# Patient Record
Sex: MALE | Race: WHITE | NOT HISPANIC OR LATINO | Employment: FULL TIME | URBAN - METROPOLITAN AREA
[De-identification: names, ages, dates, MRNs, and addresses within clinical notes are randomized per-mention and may not be internally consistent; named-entity substitution may affect disease eponyms.]

---

## 2023-07-26 ENCOUNTER — OFFICE VISIT (OUTPATIENT)
Dept: DERMATOLOGY | Facility: CLINIC | Age: 28
End: 2023-07-26
Payer: COMMERCIAL

## 2023-07-26 VITALS — BODY MASS INDEX: 34.36 KG/M2 | WEIGHT: 240 LBS | HEIGHT: 70 IN

## 2023-07-26 DIAGNOSIS — L40.9 PSORIASIS: Primary | ICD-10-CM

## 2023-07-26 DIAGNOSIS — L05.91 PILONIDAL CYST: ICD-10-CM

## 2023-07-26 PROCEDURE — 99203 OFFICE O/P NEW LOW 30 MIN: CPT | Performed by: DERMATOLOGY

## 2023-07-26 RX ORDER — CLOBETASOL PROPIONATE 0.46 MG/ML
SOLUTION TOPICAL
COMMUNITY
Start: 2019-09-10

## 2023-07-26 RX ORDER — CLOBETASOL PROPIONATE 0.5 MG/G
CREAM TOPICAL
COMMUNITY
Start: 2021-02-10

## 2023-07-26 RX ORDER — CLOBETASOL PROPIONATE 0.05 MG/G
GEL TOPICAL
COMMUNITY
Start: 2019-09-10

## 2023-07-26 RX ORDER — LEVOTHYROXINE SODIUM 0.05 MG/1
50 TABLET ORAL DAILY
COMMUNITY
Start: 2023-05-02

## 2023-07-26 NOTE — PATIENT INSTRUCTIONS
1. PSORIASIS VERSUS SEBORRHEIC DERMATITIS   Assessment and Plan:  Based on a thorough discussion of this condition and the management approach to it (including a comprehensive discussion of the known risks, side effects and potential benefits of treatment), the patient (family) agrees to implement the following specific plan:  Continue clobetasol gel and clobetasol solution as needed      Psoriasis is a chronic inflammatory condition that causes the body to make new skin cells in days rather than weeks. As these cells pile up on the surface of the skin, you may see thick, scaly patches of thickened skin. Psoriasis affects 2-4% of males and females. It can start at any age including childhood, with peaks of onset at 15-25 years and 50-60 years. It tends to persist lifelong, fluctuating in extent and severity. It is particularly common in Caucasians but may affect people of any race. About one-third of patients with psoriasis have family members with psoriasis. Psoriasis is multifactorial. It is classified as an immune-mediated inflammatory disease (IMID). Genetic factors are important and influence the type of psoriasis and response to treatment. What are the signs and symptoms of psoriasis? There are many different types of psoriasis that each have present uniquely. The types of psoriasis include:    Plaque psoriasis: About 80% to 90% of people who have psoriasis develop this type. When plaque psoriasis appears, you may see:  Plaque psoriasis usually presents with symmetrically distributed, red, scaly plaques with well-defined edges. The scale is typically silvery white, except in skin folds where the plaques often appear shiny and they may have a moist peeling surface. The most common sites are scalp, elbows and knees, but any part of the skin can be involved. The plaques are usually very persistent without treatment.   Itch is mostly mild but may be severe in some patients, leading to scratching and lichenification (thickened leathery skin with increased skin markings). Painful skin cracks or fissures may occur. When psoriatic plaques clear up, they may leave brown or pale marks that can be expected to fade over several months. Guttate psoriasis: When someone gets this type of psoriasis, you often see tiny bumps appear on the skin quite suddenly. The bumps tend to cover much of the torso, legs, and arms. Sometimes, the bumps also develop on the face, scalp, and ears. No matter where they appear, the bumps tend to be:   Small and scaly  [place]-colored to pink  Temporary, clearing in a few weeks or months without treatment  When guttate psoriasis clears, it may never return. Why this happens is still a bit of a mystery. Guttate psoriasis tends to develop in children and young adults who've had an infection, such as strep throat. It's possible that when the infection clears so does guttate psoriasis. It's also possible to have:  Guttate psoriasis for life  See the guttate psoriasis clear and plaque psoriasis develop later in life  Plaque psoriasis when you develop guttate psoriasis  There's no way to predict what will happen after the first flare-up of guttate psoriasis clears. Inverse psoriasis: This type of psoriasis develops in areas where skin touches skin, such as the armpits, genitals, and crease of the buttocks. Where the inverse psoriasis appears, you're likely to notice:  Smooth, red patches of skin that look raw  Little, if any, silvery-white coating  Sore or painful skin  Other names for this type of psoriasis are intertriginous psoriasis or flexural psoriasis. Pustular psoriasis: This type of psoriasis causes pus-filled bumps that usually appear only on the feet and hands. While the pus-filled bumps may look like an infection, the skin is not infected. The bumps don't contain bacteria or anything else that could cause an infection.   Where pustular psoriasis appears, you tend to notice:  Red, swollen skin that is dotted with pus-filled bumps  Extremely sore or painful skin  Brown dots (and sometimes scale) appear as the pus-filled bumps dry  Pustular psoriasis can make just about any activity that requires your hands or feet, such as typing or walking, unbearably painful. Pustular psoriasis (generalized): Serious and life-threatening, this rare type of psoriasis causes pus-filled bumps to develop on much of the skin. Also called von Zumbusch psoriasis, a flare-up causes this sequence of events:  Skin on most of the body suddenly turns dry, red, and tender. Within hours, pus-filled bumps cover most of the skin. Often within a day, the pus-filled bumps break open and pools of pus leak onto the skin. As the pus dries (usually within 24 to 48 hours), the skin dries out and peels (as shown in this picture). When the dried skin peels off, you see a smooth, glazed surface. In a few days or weeks, you may see a new crop of pus-filled bumps covering most of the skin, as the cycle repeats itself. Anyone with pustular psoriasis also feels very sick, and may develop a fever, headache, muscle weakness, and other symptoms. Medical care is often necessary to save the person's life. Erythrodermic psoriasis: Serious and life-threatening, this type of psoriasis requires immediate medical care. When someone develops erythrodermic psoriasis, you may notice:  Skin on most of the body looks burnt  Chills, fever, and the person looks extremely ill  Muscle weakness, a rapid pulse, and severe itch  The person may also be unable to keep warm, so hypothermia can set in quickly. Most people who develop this type of psoriasis already have another type of psoriasis. Before developing erythrodermic psoriasis, they often notice that their psoriasis is worsening or not improving with treatment.  If you notice either of these happening, see a board-certified dermatologist.    Nails    Nail psoriasis: With any type of psoriasis, you may see changes to your fingernails or toenails. About half of the people who have plaque psoriasis see signs of psoriasis on their fingernails at some point2. When psoriasis affects the nails, you may notice:  Tiny dents in your nails (called “nail pits”)  White, yellow, or brown discoloration under one or more nails  Crumbling, rough nails  A nail lifting up so that it's no longer attached  Buildup of skin cells beneath one or more nails, which lifts up the nail  Treatment and proper nail care can help you control nail psoriasis. Psoriatic arthritis: If you have psoriasis, it's important to pay attention to your joints. Some people who have psoriasis develop a type of arthritis called psoriatic arthritis. This is more likely to occur if you have severe psoriasis. Most people notice psoriasis on their skin years before they develop psoriatic arthritis. It's also possible to get psoriatic arthritis before psoriasis, but this is less common. When psoriatic arthritis develops, the signs can be subtle. At first, you may notice:  A swollen and tender joint, especially in a finger or toe  Heel pain  Swelling on the back of your leg, just above your heel  Stiffness in the morning that fades during the day  Like psoriasis, psoriatic arthritis cannot be cured. Treatment can prevent psoriatic arthritis from worsening, which is important. Allowed to progress, psoriatic arthritis can become disabling. Diagnosis and treatment of psoriasis   Psoriasis is usually diagnosed by clinical features, and skin biopsy if necessary. It is important to decrease factors that aggravate psoriasis. These include treating streptococcal infections, minimizing skin injuries, avoiding sun exposure if it exacerbates psoriasis, smoking, alcohol usage, decreasing stress, and maintaining an optimal body weight. Certain medications such as lithium, beta blockers, antimalarials, and NSAIDs have also been implicated.  Suddenly stopping oral steroids or strong topical steroids can cause rebound disease. There are many categories of psoriasis treatments available. Topical therapy  Mild psoriasis is generally treated with topical agents alone. Which treatment is selected may depend on body site, extent and severity of psoriasis. Emollients  Coal tar preparations  Dithranol  Salicylic acid  Vitamin D analogue (calcipotriol)  Topical corticosteroids  Calcineurin inhibitor (tacrolimus, pimecrolimus)  Phototherapy  Most psoriasis centres offer phototherapy with ultraviolet (UV) radiation, often in combination with topical or systemic agents. Types of phototherapy include  Narrowband UVB  Broadband UVB  Photochemotherapy (PUVA)  Targeted phototherapy  Systemic therapy  Moderate to severe psoriasis warrants treatment with a systemic agent and/or phototherapy. The most common treatments are:  Methotrexate  Ciclosporin  Acitretin  Other medicines occasionally used for psoriasis include:  Mycophenolate  Apremilast  Hydroxyurea  Azathioprine  6-mercaptopurine  Systemic corticosteroids are best avoided due to a risk of severe withdrawal flare of psoriasis and adverse effects. Biologics or targeted therapies are reserved for conventional treatment-resistant severe psoriasis, mainly because of expense, as side effects compare favorably with other systemic agents. These include:  Anti-tumour necrosis factor-alpha antagonists (anti-TNF?) infliximab, adalimumab and etanercept  The interleukin (IL)-12/23 antagonist ustekinumab  IL-17 antagonists such as secukinumab  Many other monoclonal antibodies are under investigation in the treatment of psoriasis.     2. PILONIDAL CYST   Assessment and Plan:  Based on a thorough discussion of this condition and the management approach to it (including a comprehensive discussion of the known risks, side effects and potential benefits of treatment), the patient (family) agrees to implement the following specific plan:  Could try over the counter Hibiclens wash   Could refer to general surgery if you would like it removed

## 2023-07-26 NOTE — PROGRESS NOTES
[facility] Note     Patient Name: Cate Sarah  Encounter Date: 07/26/2023     Have you been cared for by a [facility]ologist in the last 3 years and, if so, which description applies to you? NO. I am considered a "new" patient and must complete all patient intake questions. I am MALE/not capable of bearing children. REVIEW OF SYSTEMS:  Have you recently had or currently have any of the following? · Recent fever or chills? No  · Any non-healing wound? No   PAST MEDICAL HISTORY:  Have you personally ever had or currently have any of the following? If "YES," then please provide more detail. · Skin cancer (such as Melanoma, Basal Cell Carcinoma, Squamous Cell Carcinoma? No  · Tuberculosis, HIV/AIDS, Hepatitis B or C: No  · Systemic Immunosuppression such as Diabetes, Biologic or Immunotherapy, Chemotherapy, Organ Transplantation, Bone Marrow Transplantation No  · Radiation Treatment No   FAMILY HISTORY:  Any "first degree relatives" (parent, brother, sister, or child) with the following? • Skin Cancer, Pancreatic or Other Cancer? No   PATIENT EXPERIENCE:    • Do you want the Dermatologist to perform a COMPLETE skin exam today including a clinical examination under the "bra and underwear" areas? NO  • If necessary, do we have your permission to call and leave a detailed message on your Preferred Phone number that includes your specific medical information? Yes      Not on File No current outpatient medications on file. • Whom besides the patient is providing clinical information about today's encounter?   o NO ADDITIONAL HISTORIAN (patient alone provided history)    Physical Exam and Assessment/Plan by Diagnosis:    1.  PSORIASIS VERSUS SEBORRHEIC DERMATITIS     Physical Exam:  • Anatomic Location Affected:  scalp  • Morphological Description:  Mild erythema   • Severity: mild  • Body Percent Affected: 2  • Pertinent Positives:  • Pertinent Negatives: no joint pain    Assessment and Plan:  Based on a thorough discussion of this condition and the management approach to it (including a comprehensive discussion of the known risks, side effects and potential benefits of treatment), the patient (family) agrees to implement the following specific plan:  • Continue clobetasol gel and clobetasol solution as needed      Psoriasis is a chronic inflammatory condition that causes the body to make new skin cells in days rather than weeks. As these cells pile up on the surface of the skin, you may see thick, scaly patches of thickened skin. Psoriasis affects 2-4% of males and females. It can start at any age including childhood, with peaks of onset at 15-25 years and 50-60 years. It tends to persist lifelong, fluctuating in extent and severity. It is particularly common in Caucasians but may affect people of any race. About one-third of patients with psoriasis have family members with psoriasis. Psoriasis is multifactorial. It is classified as an immune-mediated inflammatory disease (IMID). Genetic factors are important and influence the type of psoriasis and response to treatment. What are the signs and symptoms of psoriasis? There are many different types of psoriasis that each have present uniquely. The types of psoriasis include:    Plaque psoriasis: About 80% to 90% of people who have psoriasis develop this type. When plaque psoriasis appears, you may see:  Plaque psoriasis usually presents with symmetrically distributed, red, scaly plaques with well-defined edges. The scale is typically silvery white, except in skin folds where the plaques often appear shiny and they may have a moist peeling surface. The most common sites are scalp, elbows and knees, but any part of the skin can be involved. The plaques are usually very persistent without treatment.   Itch is mostly mild but may be severe in some patients, leading to scratching and lichenification (thickened leathery skin with increased skin markings). Painful skin cracks or fissures may occur. When psoriatic plaques clear up, they may leave brown or pale marks that can be expected to fade over several months. Guttate psoriasis: When someone gets this type of psoriasis, you often see tiny bumps appear on the skin quite suddenly. The bumps tend to cover much of the torso, legs, and arms. Sometimes, the bumps also develop on the face, scalp, and ears. No matter where they appear, the bumps tend to be:   • Small and scaly  • [place]-colored to pink  • Temporary, clearing in a few weeks or months without treatment  When guttate psoriasis clears, it may never return. Why this happens is still a bit of a mystery. Guttate psoriasis tends to develop in children and young adults who've had an infection, such as strep throat. It's possible that when the infection clears so does guttate psoriasis. It's also possible to have:  • Guttate psoriasis for life  • See the guttate psoriasis clear and plaque psoriasis develop later in life  • Plaque psoriasis when you develop guttate psoriasis  There's no way to predict what will happen after the first flare-up of guttate psoriasis clears. Inverse psoriasis: This type of psoriasis develops in areas where skin touches skin, such as the armpits, genitals, and crease of the buttocks. Where the inverse psoriasis appears, you're likely to notice:  • Smooth, red patches of skin that look raw  • Little, if any, silvery-white coating  • Sore or painful skin  Other names for this type of psoriasis are intertriginous psoriasis or flexural psoriasis. Pustular psoriasis: This type of psoriasis causes pus-filled bumps that usually appear only on the feet and hands. While the pus-filled bumps may look like an infection, the skin is not infected. The bumps don't contain bacteria or anything else that could cause an infection.   Where pustular psoriasis appears, you tend to notice:  • Red, swollen skin that is dotted with pus-filled bumps  • Extremely sore or painful skin  • Brown dots (and sometimes scale) appear as the pus-filled bumps dry  Pustular psoriasis can make just about any activity that requires your hands or feet, such as typing or walking, unbearably painful. Pustular psoriasis (generalized): Serious and life-threatening, this rare type of psoriasis causes pus-filled bumps to develop on much of the skin. Also called von Zumbusch psoriasis, a flare-up causes this sequence of events:  1. Skin on most of the body suddenly turns dry, red, and tender. 2. Within hours, pus-filled bumps cover most of the skin. 3. Often within a day, the pus-filled bumps break open and pools of pus leak onto the skin. 4. As the pus dries (usually within 24 to 48 hours), the skin dries out and peels (as shown in this picture). 5. When the dried skin peels off, you see a smooth, glazed surface. 6. In a few days or weeks, you may see a new crop of pus-filled bumps covering most of the skin, as the cycle repeats itself. Anyone with pustular psoriasis also feels very sick, and may develop a fever, headache, muscle weakness, and other symptoms. Medical care is often necessary to save the person's life. Erythrodermic psoriasis: Serious and life-threatening, this type of psoriasis requires immediate medical care. When someone develops erythrodermic psoriasis, you may notice:  • Skin on most of the body looks burnt  • Chills, fever, and the person looks extremely ill  • Muscle weakness, a rapid pulse, and severe itch  The person may also be unable to keep warm, so hypothermia can set in quickly. Most people who develop this type of psoriasis already have another type of psoriasis. Before developing erythrodermic psoriasis, they often notice that their psoriasis is worsening or not improving with treatment.  If you notice either of these happening, see a board-certified dermatologist.    Nails    Nail psoriasis: With any type of psoriasis, you may see changes to your fingernails or toenails. About half of the people who have plaque psoriasis see signs of psoriasis on their fingernails at some point2. When psoriasis affects the nails, you may notice:  • Tiny dents in your nails (called “nail pits”)  • White, yellow, or brown discoloration under one or more nails  • Crumbling, rough nails  • A nail lifting up so that it's no longer attached  • Buildup of skin cells beneath one or more nails, which lifts up the nail  Treatment and proper nail care can help you control nail psoriasis. Psoriatic arthritis: If you have psoriasis, it's important to pay attention to your joints. Some people who have psoriasis develop a type of arthritis called psoriatic arthritis. This is more likely to occur if you have severe psoriasis. Most people notice psoriasis on their skin years before they develop psoriatic arthritis. It's also possible to get psoriatic arthritis before psoriasis, but this is less common. When psoriatic arthritis develops, the signs can be subtle. At first, you may notice:  • A swollen and tender joint, especially in a finger or toe  • Heel pain  • Swelling on the back of your leg, just above your heel  • Stiffness in the morning that fades during the day  Like psoriasis, psoriatic arthritis cannot be cured. Treatment can prevent psoriatic arthritis from worsening, which is important. Allowed to progress, psoriatic arthritis can become disabling. Diagnosis and treatment of psoriasis   Psoriasis is usually diagnosed by clinical features, and skin biopsy if necessary. It is important to decrease factors that aggravate psoriasis. These include treating streptococcal infections, minimizing skin injuries, avoiding sun exposure if it exacerbates psoriasis, smoking, alcohol usage, decreasing stress, and maintaining an optimal body weight.  Certain medications such as lithium, beta blockers, antimalarials, and NSAIDs have also been implicated. Suddenly stopping oral steroids or strong topical steroids can cause rebound disease. There are many categories of psoriasis treatments available. Topical therapy  Mild psoriasis is generally treated with topical agents alone. Which treatment is selected may depend on body site, extent and severity of psoriasis. • Emollients  • Coal tar preparations  • Dithranol  • Salicylic acid  • Vitamin D analogue (calcipotriol)  • Topical corticosteroids  • Calcineurin inhibitor (tacrolimus, pimecrolimus)  Phototherapy  Most psoriasis centres offer phototherapy with ultraviolet (UV) radiation, often in combination with topical or systemic agents. Types of phototherapy include  • Narrowband UVB  • Broadband UVB  • Photochemotherapy (PUVA)  • Targeted phototherapy  Systemic therapy  Moderate to severe psoriasis warrants treatment with a systemic agent and/or phototherapy. The most common treatments are:  • Methotrexate  • Ciclosporin  • Acitretin  Other medicines occasionally used for psoriasis include:  • Mycophenolate  • Apremilast  • Hydroxyurea  • Azathioprine  • 6-mercaptopurine  Systemic corticosteroids are best avoided due to a risk of severe withdrawal flare of psoriasis and adverse effects. Biologics or targeted therapies are reserved for conventional treatment-resistant severe psoriasis, mainly because of expense, as side effects compare favorably with other systemic agents. These include:  • Anti-tumour necrosis factor-alpha antagonists (anti-TNF?) infliximab, adalimumab and etanercept  • The interleukin (IL)-12/23 antagonist ustekinumab  • IL-17 antagonists such as secukinumab  Many other monoclonal antibodies are under investigation in the treatment of psoriasis.     2. PILONIDAL CYST   Physical Exam:  • Anatomic Location Affected:  Gluteal cleft   • Morphological Description:  subcutaneous nodule   • Pertinent Positives:  • Pertinent Negatives:    Assessment and Plan:  Based on a thorough discussion of this condition and the management approach to it (including a comprehensive discussion of the known risks, side effects and potential benefits of treatment), the patient (family) agrees to implement the following specific plan:  • Could try over the counter Hibiclens wash   • Could refer to general surgery if you would like it removed           Scribe Attestation    I,:  Miki Gr MA am acting as a scribe while in the presence of the attending physician.:       I,:  Michelle Carlson MD personally performed the services described in this documentation    as scribed in my presence.:

## 2025-08-14 ENCOUNTER — TELEPHONE (OUTPATIENT)
Age: 30
End: 2025-08-14